# Patient Record
Sex: FEMALE | Race: WHITE | NOT HISPANIC OR LATINO | URBAN - METROPOLITAN AREA
[De-identification: names, ages, dates, MRNs, and addresses within clinical notes are randomized per-mention and may not be internally consistent; named-entity substitution may affect disease eponyms.]

---

## 2024-10-15 ENCOUNTER — OUTPATIENT (OUTPATIENT)
Dept: OUTPATIENT SERVICES | Facility: HOSPITAL | Age: 39
LOS: 1 days | End: 2024-10-15
Payer: COMMERCIAL

## 2024-10-15 VITALS
HEIGHT: 60 IN | SYSTOLIC BLOOD PRESSURE: 122 MMHG | DIASTOLIC BLOOD PRESSURE: 80 MMHG | OXYGEN SATURATION: 99 % | WEIGHT: 171.08 LBS | RESPIRATION RATE: 14 BRPM | HEART RATE: 73 BPM | TEMPERATURE: 99 F

## 2024-10-15 DIAGNOSIS — Z01.818 ENCOUNTER FOR OTHER PREPROCEDURAL EXAMINATION: ICD-10-CM

## 2024-10-15 LAB
ANION GAP SERPL CALC-SCNC: 13 MMOL/L — SIGNIFICANT CHANGE UP (ref 5–17)
BLD GP AB SCN SERPL QL: NEGATIVE — SIGNIFICANT CHANGE UP
BUN SERPL-MCNC: 11 MG/DL — SIGNIFICANT CHANGE UP (ref 7–23)
CALCIUM SERPL-MCNC: 9.7 MG/DL — SIGNIFICANT CHANGE UP (ref 8.4–10.5)
CHLORIDE SERPL-SCNC: 103 MMOL/L — SIGNIFICANT CHANGE UP (ref 96–108)
CO2 SERPL-SCNC: 20 MMOL/L — LOW (ref 22–31)
CREAT SERPL-MCNC: 0.74 MG/DL — SIGNIFICANT CHANGE UP (ref 0.5–1.3)
EGFR: 107 ML/MIN/1.73M2 — SIGNIFICANT CHANGE UP
GLUCOSE SERPL-MCNC: 68 MG/DL — LOW (ref 70–99)
HCT VFR BLD CALC: 39.8 % — SIGNIFICANT CHANGE UP (ref 34.5–45)
HGB BLD-MCNC: 13.7 G/DL — SIGNIFICANT CHANGE UP (ref 11.5–15.5)
MCHC RBC-ENTMCNC: 32.1 PG — SIGNIFICANT CHANGE UP (ref 27–34)
MCHC RBC-ENTMCNC: 34.4 GM/DL — SIGNIFICANT CHANGE UP (ref 32–36)
MCV RBC AUTO: 93.2 FL — SIGNIFICANT CHANGE UP (ref 80–100)
NRBC # BLD: 0 /100 WBCS — SIGNIFICANT CHANGE UP (ref 0–0)
PLATELET # BLD AUTO: 232 K/UL — SIGNIFICANT CHANGE UP (ref 150–400)
POTASSIUM SERPL-MCNC: 4.2 MMOL/L — SIGNIFICANT CHANGE UP (ref 3.5–5.3)
POTASSIUM SERPL-SCNC: 4.2 MMOL/L — SIGNIFICANT CHANGE UP (ref 3.5–5.3)
RBC # BLD: 4.27 M/UL — SIGNIFICANT CHANGE UP (ref 3.8–5.2)
RBC # FLD: 13 % — SIGNIFICANT CHANGE UP (ref 10.3–14.5)
RH IG SCN BLD-IMP: POSITIVE — SIGNIFICANT CHANGE UP
SODIUM SERPL-SCNC: 136 MMOL/L — SIGNIFICANT CHANGE UP (ref 135–145)
WBC # BLD: 8.02 K/UL — SIGNIFICANT CHANGE UP (ref 3.8–10.5)
WBC # FLD AUTO: 8.02 K/UL — SIGNIFICANT CHANGE UP (ref 3.8–10.5)

## 2024-10-15 PROCEDURE — 80048 BASIC METABOLIC PNL TOTAL CA: CPT

## 2024-10-15 PROCEDURE — 85027 COMPLETE CBC AUTOMATED: CPT

## 2024-10-15 PROCEDURE — 86850 RBC ANTIBODY SCREEN: CPT

## 2024-10-15 PROCEDURE — 86900 BLOOD TYPING SEROLOGIC ABO: CPT

## 2024-10-15 PROCEDURE — 36415 COLL VENOUS BLD VENIPUNCTURE: CPT

## 2024-10-15 PROCEDURE — G0463: CPT

## 2024-10-15 PROCEDURE — 86901 BLOOD TYPING SEROLOGIC RH(D): CPT

## 2024-10-15 RX ORDER — SODIUM CHLORIDE IRRIG SOLUTION 0.9 %
1000 SOLUTION, IRRIGATION IRRIGATION
Refills: 0 | Status: DISCONTINUED | OUTPATIENT
Start: 2024-10-16 | End: 2024-10-16

## 2024-10-15 RX ORDER — CLINDAMYCIN PHOSPHATE 150 MG/ML
900 INJECTION, SOLUTION INTRAVENOUS ONCE
Refills: 0 | Status: DISCONTINUED | OUTPATIENT
Start: 2024-10-16 | End: 2024-10-18

## 2024-10-15 RX ORDER — CHLORHEXIDINE GLUCONATE ORAL RINSE 1.2 MG/ML
1 SOLUTION DENTAL DAILY
Refills: 0 | Status: DISCONTINUED | OUTPATIENT
Start: 2024-10-16 | End: 2024-10-16

## 2024-10-15 RX ORDER — GENTAMICIN 10 MG/ML
390 INJECTION, SOLUTION INTRAMUSCULAR; INTRAVENOUS ONCE
Refills: 0 | Status: DISCONTINUED | OUTPATIENT
Start: 2024-10-16 | End: 2024-10-18

## 2024-10-15 NOTE — OB PST NOTE - WEIGHT: TOTAL WEIGHT IN LBS
Medications addended.
Spoke to patient  today and he reported some changes in his medications:    1)He has been taken off  Plavix and Aspirin by his cardiologist. Dr Jennifer Goldstein and has been put on Eliquis 5 mg twice a day    2 He was taken off Lisinopril and put on Dronedarone for his heart rhythm
11

## 2024-10-15 NOTE — OB PST NOTE - HISTORY OF PRESENT ILLNESS
39 year old female, , IVF pregnancy with implantation date of , JEFFERY 10/12/24, with PMH of . She presents today to PST prior to scheduled Primary  on 10/16/24. Patient denies recent fever, chills, chest pain, SOB, palpitations, or recent exposure to COVID-19. 39 year old female, , JEFFERY 10/12/24, IVF pregnancy, with PMH of hypothyroidism in pregnancy (on levothyroxine since being pregnant). She presents today to PST prior to scheduled Primary  on 10/16/24. Patient denies recent fever, chills, chest pain, SOB, palpitations, or recent exposure to COVID-19.

## 2024-10-15 NOTE — OB PST NOTE - ASSESSMENT
CAPRINI SCORE    AGE RELATED RISK FACTORS                                                             [ ] Age 41-60 years                                            (1 Point)  [ ] Age: 61-74 years                                           (2 Points)                 [ ] Age= 75 years                                                (3 Points)             DISEASE RELATED RISK FACTORS                                                       [ ] Edema in the lower extremities                 (1 Point)                     [ ] Varicose veins                                               (1 Point)                                 [X ] BMI > 25 Kg/m2                                            (1 Point)                                  [ ] Serious infection (ie PNA)                            (1 Point)                     [ ] Lung disease ( COPD, Emphysema)            (1 Point)                                                                          [ ] Acute myocardial infarction                         (1 Point)                  [ ] Congestive heart failure (in the previous month)  (1 Point)         [ ] Inflammatory bowel disease                            (1 Point)                  [ ] Central venous access, PICC or Port               (2 points)       (within the last month)                                                                [ ] Stroke (in the previous month)                        (5 Points)    [ ] Previous or present malignancy                       (2 points)                                                                                                                                                         HEMATOLOGY RELATED FACTORS                                                         [ ] Prior episodes of VTE                                     (3 Points)                     [ ] Positive family history for VTE                      (3 Points)                  [ ] Prothrombin 80673 A                                     (3 Points)                     [ ] Factor V Leiden                                                (3 Points)                        [ ] Lupus anticoagulants                                      (3 Points)                                                           [ ] Anticardiolipin antibodies                              (3 Points)                                                       [ ] High homocysteine in the blood                   (3 Points)                                             [ ] Other congenital or acquired thrombophilia      (3 Points)                                                [ ] Heparin induced thrombocytopenia                  (3 Points)                                        MOBILITY RELATED FACTORS  [ ] Bed rest                                                         (1 Point)  [ ] Plaster cast                                                    (2 points)  [ ] Bed bound for more than 72 hours           (2 Points)    GENDER SPECIFIC FACTORS  [X ] Pregnancy or had a baby within the last month   (1 Point)  [ ] Post-partum < 6 weeks                                   (1 Point)  [ ] Hormonal therapy  or oral contraception   (1 Point)  [ ] History of pregnancy complications              (1 point)  [ ] Unexplained or recurrent              (1 Point)    OTHER RISK FACTORS                                           (1 Point)  [ ] BMI >40, smoking, diabetes requiring insulin, chemotherapy  blood transfusions and length of surgery over 2 hours    SURGERY RELATED RISK FACTORS  [ ]  Section within the last month     (1 Point)  [ ] Minor surgery                                                  (1 Point)  [ ] Arthroscopic surgery                                       (2 Points)  [X ] Planned major surgery lasting more            (2 Points)      than 45 minutes     [ ] Elective hip or knee joint replacement       (5 points)       surgery                                                TRAUMA RELATED RISK FACTORS  [ ] Fracture of the hip, pelvis, or leg                       (5 Points)  [ ] Spinal cord injury resulting in paralysis             (5 points)       (in the previous month)    [ ] Paralysis  (less than 1 month)                             (5 Points)  [ ] Multiple Trauma within 1 month                        (5 Points)    Total Score [    4    ]    Caprini Score 0-2: Low Risk, NO VTE prophylaxis required for most patients, encourage ambulation  Caprini Score 3-6: Moderate Risk , pharmacologic VTE prophylaxis is indicated for most patients (in the absence of contraindications)  Caprini Score Greater than or =7: High risk, pharmocologic VTE prophylaxis indicated for most patients (in the absence of contraindications)

## 2024-10-15 NOTE — OB PST NOTE - NS_OBGYNHISTORY_OBGYN_ALL_OB_FT
39 year old female, , JEFFERY 10/12/24, IVF pregnancy, with PMH of hypothyroidism in pregnancy (on levothyroxine since being pregnant). She presents today to PST prior to scheduled Primary  on 10/16/24.

## 2024-10-15 NOTE — OB PST NOTE - PROBLEM SELECTOR PLAN 1
Primary  on 10/16/24.   CBC, BMP, and T&S done today at [facility].  Pre op instructions, including chlorhexidine, provided and all questions answered.  ERP instructions provided.

## 2024-10-15 NOTE — OB PST NOTE - NS_PREOPBLOODCONS_OBGYN_ALL_OB
The patient/family was/were informed of limited nature of the exam. Representative images were printed to be scanned into the chart or directly uploaded into the medical record. n/a

## 2024-10-16 ENCOUNTER — INPATIENT (INPATIENT)
Facility: HOSPITAL | Age: 39
LOS: 1 days | Discharge: HOME | DRG: 998 | End: 2024-10-18
Attending: STUDENT IN AN ORGANIZED HEALTH CARE EDUCATION/TRAINING PROGRAM | Admitting: STUDENT IN AN ORGANIZED HEALTH CARE EDUCATION/TRAINING PROGRAM
Payer: COMMERCIAL

## 2024-10-16 VITALS — HEART RATE: 81 BPM | SYSTOLIC BLOOD PRESSURE: 119 MMHG | DIASTOLIC BLOOD PRESSURE: 69 MMHG

## 2024-10-16 LAB — T PALLIDUM AB TITR SER: NEGATIVE — SIGNIFICANT CHANGE UP

## 2024-10-16 PROCEDURE — 59514 CESAREAN DELIVERY ONLY: CPT | Mod: AS,U9

## 2024-10-16 RX ORDER — OXYCODONE HYDROCHLORIDE 30 MG/1
5 TABLET, FILM COATED, EXTENDED RELEASE ORAL
Refills: 0 | Status: DISCONTINUED | OUTPATIENT
Start: 2024-10-16 | End: 2024-10-18

## 2024-10-16 RX ORDER — MAGNESIUM HYDROXIDE 400 MG/5ML
30 SUSPENSION, ORAL (FINAL DOSE FORM) ORAL
Refills: 0 | Status: DISCONTINUED | OUTPATIENT
Start: 2024-10-16 | End: 2024-10-18

## 2024-10-16 RX ORDER — FAMOTIDINE 40 MG
20 TABLET ORAL ONCE
Refills: 0 | Status: COMPLETED | OUTPATIENT
Start: 2024-10-16 | End: 2024-10-16

## 2024-10-16 RX ORDER — SODIUM CHLORIDE IRRIG SOLUTION 0.9 %
1000 SOLUTION, IRRIGATION IRRIGATION
Refills: 0 | Status: DISCONTINUED | OUTPATIENT
Start: 2024-10-16 | End: 2024-10-18

## 2024-10-16 RX ORDER — ONDANSETRON HCL/PF 4 MG/2 ML
4 VIAL (ML) INJECTION EVERY 6 HOURS
Refills: 0 | Status: DISCONTINUED | OUTPATIENT
Start: 2024-10-16 | End: 2024-10-17

## 2024-10-16 RX ORDER — OXYCODONE HYDROCHLORIDE 30 MG/1
5 TABLET, FILM COATED, EXTENDED RELEASE ORAL
Refills: 0 | Status: DISCONTINUED | OUTPATIENT
Start: 2024-10-16 | End: 2024-10-17

## 2024-10-16 RX ORDER — INFLUENZA VIRUS VACCINE 15; 15; 15; 15 UG/.5ML; UG/.5ML; UG/.5ML; UG/.5ML
0.5 SUSPENSION INTRAMUSCULAR ONCE
Refills: 0 | Status: DISCONTINUED | OUTPATIENT
Start: 2024-10-16 | End: 2024-10-18

## 2024-10-16 RX ORDER — MORPHINE SULFATE 30 MG/1
0.1 TABLET, FILM COATED, EXTENDED RELEASE ORAL ONCE
Refills: 0 | Status: DISCONTINUED | OUTPATIENT
Start: 2024-10-16 | End: 2024-10-17

## 2024-10-16 RX ORDER — NALOXONE HYDROCHLORIDE 0.4 MG/ML
0.1 INJECTION, SOLUTION INTRAMUSCULAR; INTRAVENOUS; SUBCUTANEOUS
Refills: 0 | Status: DISCONTINUED | OUTPATIENT
Start: 2024-10-16 | End: 2024-10-17

## 2024-10-16 RX ORDER — TETANUS TOXOID, REDUCED DIPHTHERIA TOXOID AND ACELLULAR PERTUSSIS VACCINE, ADSORBED 5; 2.5; 8; 8; 2.5 [IU]/.5ML; [IU]/.5ML; UG/.5ML; UG/.5ML; UG/.5ML
0.5 SUSPENSION INTRAMUSCULAR ONCE
Refills: 0 | Status: DISCONTINUED | OUTPATIENT
Start: 2024-10-16 | End: 2024-10-18

## 2024-10-16 RX ORDER — OXYTOCIN/RINGER'S LACTATE 20/500ML
42 PLASTIC BAG, INJECTION (ML) INTRAVENOUS
Qty: 30 | Refills: 0 | Status: DISCONTINUED | OUTPATIENT
Start: 2024-10-16 | End: 2024-10-18

## 2024-10-16 RX ORDER — SODIUM CITRATE AND CITRIC ACID MONOHYDRATE 334; 500 MG/5ML; MG/5ML
15 SOLUTION ORAL ONCE
Refills: 0 | Status: COMPLETED | OUTPATIENT
Start: 2024-10-16 | End: 2024-10-16

## 2024-10-16 RX ORDER — OXYCODONE HYDROCHLORIDE 30 MG/1
5 TABLET, FILM COATED, EXTENDED RELEASE ORAL ONCE
Refills: 0 | Status: DISCONTINUED | OUTPATIENT
Start: 2024-10-16 | End: 2024-10-18

## 2024-10-16 RX ORDER — ACETAMINOPHEN 325 MG
1000 TABLET ORAL ONCE
Refills: 0 | Status: COMPLETED | OUTPATIENT
Start: 2024-10-16 | End: 2024-10-16

## 2024-10-16 RX ORDER — KETOROLAC TROMETHAMINE 10 MG/1
30 TABLET, FILM COATED ORAL EVERY 6 HOURS
Refills: 0 | Status: ACTIVE | OUTPATIENT
Start: 2024-10-16 | End: 2024-10-18

## 2024-10-16 RX ORDER — ACETAMINOPHEN 325 MG
975 TABLET ORAL
Refills: 0 | Status: DISCONTINUED | OUTPATIENT
Start: 2024-10-16 | End: 2024-10-18

## 2024-10-16 RX ORDER — DIPHENHYDRAMINE HCL 12.5MG/5ML
25 LIQUID (ML) ORAL EVERY 6 HOURS
Refills: 0 | Status: DISCONTINUED | OUTPATIENT
Start: 2024-10-16 | End: 2024-10-18

## 2024-10-16 RX ADMIN — Medication 125 MILLILITER(S): at 10:40

## 2024-10-16 RX ADMIN — Medication 975 MILLIGRAM(S): at 20:29

## 2024-10-16 RX ADMIN — KETOROLAC TROMETHAMINE 30 MILLIGRAM(S): 10 TABLET, FILM COATED ORAL at 18:26

## 2024-10-16 RX ADMIN — Medication 125 MILLILITER(S): at 18:49

## 2024-10-16 RX ADMIN — Medication 975 MILLIGRAM(S): at 20:59

## 2024-10-16 RX ADMIN — KETOROLAC TROMETHAMINE 30 MILLIGRAM(S): 10 TABLET, FILM COATED ORAL at 19:15

## 2024-10-16 RX ADMIN — Medication 20 MILLIGRAM(S): at 10:40

## 2024-10-16 RX ADMIN — SODIUM CITRATE AND CITRIC ACID MONOHYDRATE 15 MILLILITER(S): 334; 500 SOLUTION ORAL at 10:40

## 2024-10-16 RX ADMIN — Medication 400 MILLIGRAM(S): at 14:28

## 2024-10-16 NOTE — OB RN DELIVERY SUMMARY - NSSELHIDDEN_OBGYN_ALL_OB_FT
[NS_DeliveryAttending1_OBGYN_ALL_OB_FT:[id number]=],[NS_DeliveryAssist1_OBGYN_ALL_OB_FT:[id number]=],[NS_DeliveryRN_OBGYN_ALL_OB_FT:[id number]==]

## 2024-10-16 NOTE — OB PROVIDER DELIVERY SUMMARY - NSSELHIDDEN_OBGYN_ALL_OB_FT
[NS_DeliveryAttending1_OBGYN_ALL_OB_FT:[id number]=],[NS_DeliveryAssist1_OBGYN_ALL_OB_FT:[id number]=],[NS_DeliveryRN_OBGYN_ALL_OB_FT:[id number]==]

## 2024-10-16 NOTE — OB PROVIDER DELIVERY SUMMARY - NSPROVIDERDELIVERYNOTE_OBGYN_ALL_OB_FT
Elective pLTCS of viable male infant. Grossly normal uterus, tubes, ovaries. Vaccuum applied successfully. Single layer uterine closure. Peritoneum repaired. Excellent hemostasis. APGARS 9/9    IVF 2L    Uout 50    DIctation #: [id number]

## 2024-10-16 NOTE — OB RN INTRAOPERATIVE NOTE - NSSELHIDDEN_OBGYN_ALL_OB_FT
[NS_DeliveryAttending1_OBGYN_ALL_OB_FT:[id number]=],[NS_DeliveryAssist1_OBGYN_ALL_OB_FT:[id number]=],[NS_DeliveryRN_OBGYN_ALL_OB_FT:[id number]==]

## 2024-10-16 NOTE — OB PROVIDER H&P - NSLOWPPHRISK_OBGYN_A_OB
No previous uterine incision/Lozano Pregnancy/Less than or equal to 4 previous vaginal births/No known bleeding disorder/No history of postpartum hemorrhage/No other PPH risks indicated

## 2024-10-16 NOTE — OB PROVIDER H&P - NS ATTEND AMEND GEN_ALL_CORE FT
Presenting for scheduled pLTCS.   Admit to L&D   NPO   T&S  Anesthesia consult     Camelia Cabello MD

## 2024-10-16 NOTE — OB PROVIDER H&P - ASSESSMENT
A/P: 40yo  @ 40w4d here for elective pLTCS  - Admit to L&D  - Routine labs   - EFM/TOCO: resuscitative measures prn  - NPO, Bicitra, Pepcid  - Anesthesia consult for epidural  - Plan for pLTCS    d/w Dr. Destiney Reyes, PAPearlC

## 2024-10-16 NOTE — OB PROVIDER H&P - NSHPPHYSICALEXAM_GEN_ALL_CORE
PE:    T(C): 36.9 (10-16-24 @ 09:48), Max: 37.2 (10-15-24 @ 11:29)  HR: 72 (10-16-24 @ 10:43) (67 - 91)  BP: 119/68 (10-16-24 @ 09:48) (119/68 - 122/80)  RR: 18 (10-16-24 @ 09:48) (12 - 18)  SpO2: 97% (10-16-24 @ 10:43) (97% - 100%)    General: NAD, A&Ox3  CV: RRR  Lungs: Clear bilat   Abd: soft, nontender, gravid  VE: deferred  EFM: reactive

## 2024-10-16 NOTE — OB RN PATIENT PROFILE - POST PARTUM DEPRESSION SCREEN OB 5
[id number].  Cony has been afebrile this shift.  Vitals are stable and WNL.  Lungs are clear.  Pt denied pain, nausea or other discomfort.  She took her pills with mom during the evening with no problem.  This morning will be her first dose of oral MMF.  This is a solution and mom thinks this will be very difficult to get her to take. Pt up late on her tablet, mom sleeping at bedside.  Hourly rounding completed.  Continue POC.   no

## 2024-10-16 NOTE — OB NEONATOLOGY/PEDIATRICIAN DELIVERY SUMMARY - NSPEDSNEONOTESA_OBGYN_ALL_OB_FT
Requested by OB to attend  for vacuum assisted delivery. 40.4 week old infant born to a 40 y/o  mother. Maternal labs: Blood Type: AB positive, Hep B negative, Hep C negative, Rubella Immune, RPR Negative, HIV negative, GBS Unknown. No significant maternal history. Prenatal course complicated by hypothyroidism on synthroid. This was an IVF pregnancy. AROM at delivery, clear. Arrived at 45 seconds of life. Infant dried, warmed, suctioned, and stimulated. Hat was placed. Infant with good tone and respiratory effort. Mom wants to breastfeed exclusively.  Mom defers Hep B. Desires Circumcision. EOS Score=0.05.

## 2024-10-16 NOTE — OB RN DELIVERY SUMMARY - NS_SEPSISRSKCALC_OBGYN_ALL_OB_FT
EOS calculated successfully. EOS Risk Factor: 0.5/1000 live births ([facility] national incidence); GA=40w4d; Temp=98.4; ROM=0.017; GBS='Unknown'; Antibiotics='No antibiotics or any antibiotics < 2 hrs prior to birth'

## 2024-10-16 NOTE — PRE-ANESTHESIA EVALUATION ADULT - NSANTHADDINFOFT_GEN_ALL_CORE
Patient requested  to be present during neuraxial anesthetic placement. Explained that it was a sterile procedure and he likely would be unable to be present while performing the neuraxial block. She also wanted to know the duration of the spinal anesthetic placement. Again, I explained that the procedure would take on average less than 5 minutes but that at times it may become more difficult due to positioning, scoliosis, or a myriad of other reasons. Finally, she asked for sedation. I explained to her that it was a possibility but generally only after the baby was delivered, since the medication could cross the placenta barrier and cause somnolence. Also, the patient would become sedated and possible have amnesia causing her to forget all or parts of the delivery.     I attempted to reassure the patient while being honest with her. She understands the risks, benefits, and alternatives, and agrees to proceed. She did become teary eyed after explaining all of this.

## 2024-10-16 NOTE — OB RN PATIENT PROFILE - BREASTFEEDING MOTHER TAUGHT HOW TO HAND EXPRESS THEIR OWN MILK
Health Maintenance       Colorectal Cancer Screen (View Topic Details)  Never done    COVID-19 Vaccine (1 - 2023-24 season)  Never done    Depression Screening (Yearly)  Due soon on 2/22/2025           Following review of the above:  Patient is not proceeding with: Colorectal Cancer Screening    Note: Refer to final orders and clinician documentation.       Statement Selected

## 2024-10-16 NOTE — OB RN DELIVERY SUMMARY - NS_VACUUMATTEMPT_OBGYN_ALL_OB
Message   Call patient  Chest X-ay showed no evidence of pneumonia  Verified Results  * XR CHEST PA & LATERAL [id number] 11:31AM Sofiya Watson    Order Number: [id number]     Test Name Result Flag Reference   XR CHEST PA & LATERAL (Report)     CHEST - DUAL ENERGY     INDICATION: Dyspnea     COMPARISON: None     VIEWS: PA (including soft tissue/bone algorithms) and lateral projections; 5 images     FINDINGS:        Cardiomediastinal silhouette appears unremarkable  The lungs are clear  No pneumothorax or pleural effusion  Visualized osseous structures appear within normal limits for the patient's age  IMPRESSION:     No active pulmonary disease         Workstation performed: [id number]     Signed by:   Calderon Key MD   6/28/16 Attempted and Successful

## 2024-10-17 LAB
BASOPHILS # BLD AUTO: 0.05 K/UL — SIGNIFICANT CHANGE UP (ref 0–0.2)
BASOPHILS NFR BLD AUTO: 0.4 % — SIGNIFICANT CHANGE UP (ref 0–2)
EOSINOPHIL # BLD AUTO: 0.01 K/UL — SIGNIFICANT CHANGE UP (ref 0–0.5)
EOSINOPHIL NFR BLD AUTO: 0.1 % — SIGNIFICANT CHANGE UP (ref 0–6)
HCT VFR BLD CALC: 33.6 % — LOW (ref 34.5–45)
HGB BLD-MCNC: 11.5 G/DL — SIGNIFICANT CHANGE UP (ref 11.5–15.5)
IMM GRANULOCYTES NFR BLD AUTO: 0.4 % — SIGNIFICANT CHANGE UP (ref 0–0.9)
LYMPHOCYTES # BLD AUTO: 19.4 % — SIGNIFICANT CHANGE UP (ref 13–44)
LYMPHOCYTES # BLD AUTO: 2.74 K/UL — SIGNIFICANT CHANGE UP (ref 1–3.3)
MCHC RBC-ENTMCNC: 32.4 PG — SIGNIFICANT CHANGE UP (ref 27–34)
MCHC RBC-ENTMCNC: 34.2 GM/DL — SIGNIFICANT CHANGE UP (ref 32–36)
MCV RBC AUTO: 94.6 FL — SIGNIFICANT CHANGE UP (ref 80–100)
MONOCYTES # BLD AUTO: 1.23 K/UL — HIGH (ref 0–0.9)
MONOCYTES NFR BLD AUTO: 8.7 % — SIGNIFICANT CHANGE UP (ref 2–14)
NEUTROPHILS # BLD AUTO: 10.03 K/UL — HIGH (ref 1.8–7.4)
NEUTROPHILS NFR BLD AUTO: 71 % — SIGNIFICANT CHANGE UP (ref 43–77)
NRBC # BLD: 0 /100 WBCS — SIGNIFICANT CHANGE UP (ref 0–0)
PLATELET # BLD AUTO: 210 K/UL — SIGNIFICANT CHANGE UP (ref 150–400)
RBC # BLD: 3.55 M/UL — LOW (ref 3.8–5.2)
RBC # FLD: 13.1 % — SIGNIFICANT CHANGE UP (ref 10.3–14.5)
WBC # BLD: 14.12 K/UL — HIGH (ref 3.8–10.5)
WBC # FLD AUTO: 14.12 K/UL — HIGH (ref 3.8–10.5)

## 2024-10-17 RX ADMIN — Medication 975 MILLIGRAM(S): at 03:33

## 2024-10-17 RX ADMIN — Medication 975 MILLIGRAM(S): at 20:11

## 2024-10-17 RX ADMIN — Medication 600 MILLIGRAM(S): at 17:12

## 2024-10-17 RX ADMIN — Medication 975 MILLIGRAM(S): at 09:15

## 2024-10-17 RX ADMIN — KETOROLAC TROMETHAMINE 30 MILLIGRAM(S): 10 TABLET, FILM COATED ORAL at 05:42

## 2024-10-17 RX ADMIN — Medication 975 MILLIGRAM(S): at 09:45

## 2024-10-17 RX ADMIN — Medication 600 MILLIGRAM(S): at 17:45

## 2024-10-17 RX ADMIN — Medication 5000 UNIT(S): at 05:42

## 2024-10-17 RX ADMIN — Medication 975 MILLIGRAM(S): at 04:03

## 2024-10-17 RX ADMIN — Medication 600 MILLIGRAM(S): at 23:34

## 2024-10-17 RX ADMIN — KETOROLAC TROMETHAMINE 30 MILLIGRAM(S): 10 TABLET, FILM COATED ORAL at 00:31

## 2024-10-17 RX ADMIN — Medication 975 MILLIGRAM(S): at 20:41

## 2024-10-17 RX ADMIN — Medication 5000 UNIT(S): at 17:33

## 2024-10-17 RX ADMIN — KETOROLAC TROMETHAMINE 30 MILLIGRAM(S): 10 TABLET, FILM COATED ORAL at 00:01

## 2024-10-17 RX ADMIN — KETOROLAC TROMETHAMINE 30 MILLIGRAM(S): 10 TABLET, FILM COATED ORAL at 06:12

## 2024-10-17 RX ADMIN — Medication 50 MICROGRAM(S): at 06:55

## 2024-10-17 RX ADMIN — KETOROLAC TROMETHAMINE 30 MILLIGRAM(S): 10 TABLET, FILM COATED ORAL at 13:01

## 2024-10-17 NOTE — PROGRESS NOTE ADULT - PROVIDER SPECIALTY LIST ADULT
Baby's Name: Janet Giang Mother's Name: Shmuel Flores       Baby's Date of Birth: [birth year] Date of Procedure: 11/9/17       Procedure(s) Done: Frenulectomy Physician: Dr. Pranav Matta         Baby's Name:   Weight / Amount Date/Location     Birth Weight  6 lb 14 oz   [birth year]/17    Discharge Weight  6 lb      Weight Check  7 lb 7 oz  11/10/17 [facility]    Last Weight 7 lb 12.5 oz 11/16/17 Pediatrician    Today's Weight - Pre-feed  8 lb 6.7 oz  11/20/17 [facility]    Today's Weight - Post-feed  8 lb 7.9  11/20/17 [facility]    Total Amount of Milk Transferred  1.2 oz          Next weight check at [address]  Date Location    7-10 days  [facility]     Notes:  Cheo Connelly had a freulectomy/frenotomy procedure on 11/9/17. This is a follow up consultation to evaluate feeding efficiency and weight gain. Mom has been breast feeding Tom every 2-3 hours, limiting him to 15 minutes per breast per pediatrician recommendation. Verle Headings is then supplemented with formula (3-4 ounces). To boost milk supply, mom should continue to take the More Milk Plus supplement (2 capsules, 3 times/day). Recommendations to add lactation cookies or Mother's Milk tea were made. To increase milk supply, mom is encouraged to pump 15 minutes following nursing sessions.     Todays weight gain= 1.2 ounces in 26 minutes of breastfeeding Anesthesia

## 2024-10-18 VITALS
RESPIRATION RATE: 18 BRPM | HEART RATE: 68 BPM | SYSTOLIC BLOOD PRESSURE: 108 MMHG | DIASTOLIC BLOOD PRESSURE: 67 MMHG | OXYGEN SATURATION: 100 % | TEMPERATURE: 98 F

## 2024-10-18 PROCEDURE — 86901 BLOOD TYPING SEROLOGIC RH(D): CPT

## 2024-10-18 PROCEDURE — 85025 COMPLETE CBC W/AUTO DIFF WBC: CPT

## 2024-10-18 PROCEDURE — 86850 RBC ANTIBODY SCREEN: CPT

## 2024-10-18 PROCEDURE — 86780 TREPONEMA PALLIDUM: CPT

## 2024-10-18 PROCEDURE — 86900 BLOOD TYPING SEROLOGIC ABO: CPT

## 2024-10-18 PROCEDURE — 59050 FETAL MONITOR W/REPORT: CPT

## 2024-10-18 PROCEDURE — 59025 FETAL NON-STRESS TEST: CPT

## 2024-10-18 RX ORDER — FAMOTIDINE 40 MG
1 TABLET ORAL
Refills: 0 | DISCHARGE

## 2024-10-18 RX ORDER — ASPIRIN 325 MG
1 TABLET ORAL
Refills: 0 | DISCHARGE

## 2024-10-18 RX ORDER — ACETAMINOPHEN 325 MG
3 TABLET ORAL
Qty: 0 | Refills: 0 | DISCHARGE
Start: 2024-10-18

## 2024-10-18 RX ORDER — PRENATAL VIT,CAL 76/IRON/FOLIC 29 MG-1 MG
1 TABLET ORAL
Refills: 0 | DISCHARGE

## 2024-10-18 RX ADMIN — Medication 975 MILLIGRAM(S): at 08:13

## 2024-10-18 RX ADMIN — Medication 975 MILLIGRAM(S): at 08:43

## 2024-10-18 RX ADMIN — Medication 5000 UNIT(S): at 05:20

## 2024-10-18 RX ADMIN — Medication 50 MICROGRAM(S): at 05:21

## 2024-10-18 NOTE — DISCHARGE NOTE OB - MEDICATION SUMMARY - MEDICATIONS TO TAKE
I will START or STAY ON the medications listed below when I get home from the hospital:    ibuprofen 600 mg oral tablet  -- 1 tab(s) by mouth every 6 hours  -- Indication: For Encounter for  delivery without indication    acetaminophen 325 mg oral tablet  -- 3 tab(s) by mouth every 6 hours  -- Indication: For Encounter for  delivery without indication

## 2024-10-18 NOTE — PROGRESS NOTE ADULT - SUBJECTIVE AND OBJECTIVE BOX
Day 1 of Anesthesia Pain Management Service    SUBJECTIVE:  Pain Scale Score:          [X] Refer to charted pain scores    THERAPY: Received PF spinal morphine as above    OBJECTIVE:    Sedation:        	[X] Alert	[ ] Drowsy	[ ] Arousable      [ ] Asleep       [ ] Unresponsive    Side Effects:	[X] None	[ ] Nausea	[ ] Vomiting         [ ] Pruritus  		[ ] Weakness            [ ] Numbness	          [ ] Other:    ASSESSMENT/ PLAN  [X] Patient transitioned to prn analgesics  [X] Pain management per primary service, pain service to sign off   [X]Documentation and Verification of current medications
Day 1 of Anesthesia Pain Management Service    SUBJECTIVE: Doing ok  Pain Scale Score:          [X] Refer to charted pain scores    THERAPY:    s/p    100 mcg PF morphine on 10\16\24       MEDICATIONS  (STANDING):  acetaminophen     Tablet .. 975 milliGRAM(s) Oral <User Schedule>  clindamycin IVPB 900 milliGRAM(s) IV Intermittent once  diphtheria/tetanus/pertussis (acellular) Vaccine (Adacel) 0.5 milliLiter(s) IntraMuscular once  gentamicin   IVPB 390 milliGRAM(s) IV Intermittent once  heparin   Injectable 5000 Unit(s) SubCutaneous every 12 hours  ibuprofen  Tablet. 600 milliGRAM(s) Oral every 6 hours  influenza   Vaccine 0.5 milliLiter(s) IntraMuscular once  ketorolac   Injectable 30 milliGRAM(s) IV Push every 6 hours  lactated ringers. 1000 milliLiter(s) (125 mL/Hr) IV Continuous <Continuous>  levothyroxine 50 MICROGram(s) Oral daily  morphine PF Spinal 0.1 milliGRAM(s) IntraThecal. once  oxytocin Infusion 42 milliUNIT(s)/Min (42 mL/Hr) IV Continuous <Continuous>    MEDICATIONS  (PRN):  dexAMETHasone  Injectable 4 milliGRAM(s) IV Push every 6 hours PRN Nausea  diphenhydrAMINE 25 milliGRAM(s) Oral every 6 hours PRN Pruritus  lanolin Ointment 1 Application(s) Topical every 6 hours PRN Sore Nipples  magnesium hydroxide Suspension 30 milliLiter(s) Oral two times a day PRN Constipation  naloxone Injectable 0.1 milliGRAM(s) IV Push every 3 minutes PRN For ANY of the following changes in patient status:  A. Breaths Per Minute LESS THAN 10, B. Oxygen saturation LESS THAN 90%, C. Sedation score of 6 for Stop After: 4 Times  ondansetron Injectable 4 milliGRAM(s) IV Push every 6 hours PRN Nausea  oxyCODONE    IR 5 milliGRAM(s) Oral every 3 hours PRN Mild Pain (1 - 3)  oxyCODONE    IR 5 milliGRAM(s) Oral every 3 hours PRN Moderate to Severe Pain (4-10)  oxyCODONE    IR 5 milliGRAM(s) Oral once PRN Moderate to Severe Pain (4-10)  simethicone 80 milliGRAM(s) Chew every 4 hours PRN Gas      OBJECTIVE:    Sedation:        	[X] Alert	 [ ] Drowsy	[ ] Arousable      [ ] Asleep       [ ] Unresponsive    Side Effects:	[X] None 	[ ] Nausea	[ ] Vomiting         [ ] Pruritus  		[ ] Weakness            [ ] Numbness	          [ ] Other:    Vital Signs Last 24 Hrs  T(C): 36.7 (17 Oct 2024 06:17), Max: 36.9 (16 Oct 2024 09:48)  T(F): 98 (17 Oct 2024 06:17), Max: 98.4 (16 Oct 2024 09:48)  HR: 59 (17 Oct 2024 06:17) (55 - 91)  BP: 113/71 (17 Oct 2024 06:17) (93/55 - 127/89)  BP(mean): 82 (16 Oct 2024 15:45) (70 - 105)  RR: 18 (17 Oct 2024 06:17) (16 - 18)  SpO2: 100% (17 Oct 2024 06:17) (95% - 100%)    Parameters below as of 17 Oct 2024 06:17  Patient On (Oxygen Delivery Method): room air        ASSESSMENT/ PLAN  [X] Patient to be transitioned to prn analgesics today  [X] Pain management per primary service, pain service to sign off   [X]Documentation and Verification of current medications
R1 Progress Note    Patient seen and examined at bedside, no acute overnight events. No acute complaints, pain well controlled. Patient is ambulating and tolerating regular diet. Pt has passed flatus and voiding spontaneously. Denies fever, chills, CP, SOB, N/V, HA, blurred vision.     Vital Signs Last 24 Hours  T(C): 36.7 (10-17-24 @ 21:00), Max: 36.8 (10-17-24 @ 12:52)  HR: 69 (10-17-24 @ 21:00) (59 - 69)  BP: 125/81 (10-17-24 @ 21:00) (108/72 - 125/81)  RR: 18 (10-17-24 @ 21:00) (18 - 18)  SpO2: 98% (10-17-24 @ 21:00) (97% - 100%)    I&O's Summary    16 Oct 2024 07:01  -  17 Oct 2024 07:00  --------------------------------------------------------  IN: 100 mL / OUT: 1226 mL / NET: -1126 mL        Physical Exam:  General: NAD  Pulm: breathing comfortably on room air  Abdomen: Soft, appropriately-tender, mildly-distended, fundus firm  Incision: Pfannenstiel incision CDI  Pelvic: Lochia wnl    Labs:    Blood Type: AB Positive  Antibody Screen: Negative  RPR: Negative               11.5   14.12 )-----------( 210      ( 10-17 @ 06:39 )             33.6                13.7   8.02  )-----------( 232      ( 10-15 @ 12:23 )             39.8         MEDICATIONS  (STANDING):  acetaminophen     Tablet .. 975 milliGRAM(s) Oral <User Schedule>  clindamycin IVPB 900 milliGRAM(s) IV Intermittent once  diphtheria/tetanus/pertussis (acellular) Vaccine (Adacel) 0.5 milliLiter(s) IntraMuscular once  gentamicin   IVPB 390 milliGRAM(s) IV Intermittent once  heparin   Injectable 5000 Unit(s) SubCutaneous every 12 hours  ibuprofen  Tablet. 600 milliGRAM(s) Oral every 6 hours  influenza   Vaccine 0.5 milliLiter(s) IntraMuscular once  lactated ringers. 1000 milliLiter(s) (125 mL/Hr) IV Continuous <Continuous>  levothyroxine 50 MICROGram(s) Oral daily  oxytocin Infusion 42 milliUNIT(s)/Min (42 mL/Hr) IV Continuous <Continuous>    MEDICATIONS  (PRN):  diphenhydrAMINE 25 milliGRAM(s) Oral every 6 hours PRN Pruritus  lanolin Ointment 1 Application(s) Topical every 6 hours PRN Sore Nipples  magnesium hydroxide Suspension 30 milliLiter(s) Oral two times a day PRN Constipation  oxyCODONE    IR 5 milliGRAM(s) Oral every 3 hours PRN Moderate to Severe Pain (4-10)  oxyCODONE    IR 5 milliGRAM(s) Oral once PRN Moderate to Severe Pain (4-10)  simethicone 80 milliGRAM(s) Chew every 4 hours PRN Gas  
R1 Progress Note    Patient seen and examined at bedside, no acute overnight events. No acute complaints, pain well controlled. Patient is ambulating and tolerating regular diet. Pt passed flatus and is voiding spontaneously.. Denies fever, chills, CP, SOB, N/V, HA, blurred vision.     Vital Signs Last 24 Hours  T(C): 36.7 (10-17-24 @ 00:24), Max: 37.2 (10-16-24 @ 09:27)  HR: 67 (10-17-24 @ 00:24) (55 - 91)  BP: 118/67 (10-17-24 @ 00:24) (93/55 - 127/89)  RR: 18 (10-17-24 @ 00:24) (12 - 18)  SpO2: 95% (10-17-24 @ 00:24) (95% - 100%)    I&O's Summary    16 Oct 2024 07:01  -  17 Oct 2024 06:00  --------------------------------------------------------  IN: 100 mL / OUT: 826 mL / NET: -726 mL        Physical Exam:  General: NAD  Abdomen: Soft, appropriately-tender, mildly-distended, fundus firm  Incision: Pfannenstiel incision CDI  Pelvic: Lochia wnl    Labs:    Blood Type: AB Positive  Antibody Screen: Negative  RPR: Negative               13.7   8.02  )-----------( 232      ( 10-15 @ 12:23 )             39.8         MEDICATIONS  (STANDING):  acetaminophen     Tablet .. 975 milliGRAM(s) Oral <User Schedule>  clindamycin IVPB 900 milliGRAM(s) IV Intermittent once  diphtheria/tetanus/pertussis (acellular) Vaccine (Adacel) 0.5 milliLiter(s) IntraMuscular once  gentamicin   IVPB 390 milliGRAM(s) IV Intermittent once  heparin   Injectable 5000 Unit(s) SubCutaneous every 8 hours  ibuprofen  Tablet. 600 milliGRAM(s) Oral every 6 hours  influenza   Vaccine 0.5 milliLiter(s) IntraMuscular once  ketorolac   Injectable 30 milliGRAM(s) IV Push every 6 hours  lactated ringers. 1000 milliLiter(s) (125 mL/Hr) IV Continuous <Continuous>  morphine PF Spinal 0.1 milliGRAM(s) IntraThecal. once  oxytocin Infusion 42 milliUNIT(s)/Min (42 mL/Hr) IV Continuous <Continuous>    MEDICATIONS  (PRN):  dexAMETHasone  Injectable 4 milliGRAM(s) IV Push every 6 hours PRN Nausea  diphenhydrAMINE 25 milliGRAM(s) Oral every 6 hours PRN Pruritus  lanolin Ointment 1 Application(s) Topical every 6 hours PRN Sore Nipples  magnesium hydroxide Suspension 30 milliLiter(s) Oral two times a day PRN Constipation  naloxone Injectable 0.1 milliGRAM(s) IV Push every 3 minutes PRN For ANY of the following changes in patient status:  A. Breaths Per Minute LESS THAN 10, B. Oxygen saturation LESS THAN 90%, C. Sedation score of 6 for Stop After: 4 Times  ondansetron Injectable 4 milliGRAM(s) IV Push every 6 hours PRN Nausea  oxyCODONE    IR 5 milliGRAM(s) Oral every 3 hours PRN Moderate to Severe Pain (4-10)  oxyCODONE    IR 5 milliGRAM(s) Oral once PRN Moderate to Severe Pain (4-10)  oxyCODONE    IR 5 milliGRAM(s) Oral every 3 hours PRN Mild Pain (1 - 3)  simethicone 80 milliGRAM(s) Chew every 4 hours PRN Gas

## 2024-10-18 NOTE — PROGRESS NOTE ADULT - NS ATTEND AMEND GEN_ALL_CORE FT
doing well. pain ok with po pain meds. doing well. pain ok with po pain meds. moving well.   afebrile vss  incision c.d.i  pod2 doing well  wants to go home  dc home today  fu in office in 2 weeks for incision check  gregoria myles md

## 2024-10-18 NOTE — DISCHARGE NOTE OB - NS MD DC FALL RISK RISK
For information on Fall & Injury Prevention, visit: [email or web address] OR  [email or web address] OR  https://www.cdc.gov/steadi/patient.html

## 2024-10-18 NOTE — DISCHARGE NOTE OB - FINANCIAL ASSISTANCE
[facility] provides services at a reduced cost to those who are determined to be eligible through [facility]’s financial assistance program. Information regarding [facility]’s financial assistance program can be found by going to [email or web address] or by calling [phone].

## 2024-10-18 NOTE — DISCHARGE NOTE OB - PATIENT PORTAL LINK FT
You can access the FollowMyHealth Patient Portal offered by [facility] by registering at the following website: [email or web address]. By joining [employer]’s FollowMyHealth portal, you will also be able to view your health information using other applications (apps) compatible with our system.

## 2024-10-18 NOTE — DISCHARGE NOTE OB - CARE PROVIDERS DIRECT ADDRESSES
[email or web address] the [facility]

## 2024-10-18 NOTE — DISCHARGE NOTE OB - CARE PROVIDER_API CALL
Camelia Cabello  Obstetrics and Gynecology  [address] [place] [id number]  Phone: [phone]  Fax: [phone]  Established Patient  Follow Up Time: 2 weeks

## 2024-10-18 NOTE — DISCHARGE NOTE OB - CARE PLAN
Principal Discharge DX:	Encounter for  delivery without indication  Assessment and plan of treatment:	self ambulation, pain control   1

## 2024-10-18 NOTE — PROGRESS NOTE ADULT - ASSESSMENT
38y/o  POD#2 from elective pLTCS. PMH significant for hypothyroidism. Pt currently stable and meeting postpartum milestones.     #Postpartum  - Continue with po analgesia  - Increase ambulation  - Continue regular diet  - H&H stable  - Incision site c/d/i    Ivonne Terry PGY1
40y/o  POD#1 from elective pLTCS. PMH significant for hypothyroidism.     #Hypothyroidism  - Synthroid 50    #Postpartum  - Continue with po analgesia  - Increase ambulation  - Continue regular diet  - AM CBC   - Incision site c/d/i    Ivonne Terry PGY1

## 2024-11-15 PROBLEM — Z00.00 ENCOUNTER FOR PREVENTIVE HEALTH EXAMINATION: Status: ACTIVE | Noted: 2024-11-15

## 2024-11-18 ENCOUNTER — APPOINTMENT (OUTPATIENT)
Dept: ULTRASOUND IMAGING | Facility: CLINIC | Age: 39
End: 2024-11-18

## 2024-11-18 PROCEDURE — 76700 US EXAM ABDOM COMPLETE: CPT

## 2024-11-18 NOTE — DISCHARGE NOTE OB - IF BREASTFEEDING, ADD A TOTAL OF 500 EXTRA CALORIES EACH DAY
Writer ordered sleep study as requested below.   Awaiting for MD to sign order.     Statement Selected

## 2024-12-05 ENCOUNTER — APPOINTMENT (OUTPATIENT)
Dept: SURGERY | Facility: CLINIC | Age: 39
End: 2024-12-05
Payer: COMMERCIAL

## 2024-12-05 VITALS
OXYGEN SATURATION: 98 % | HEART RATE: 57 BPM | DIASTOLIC BLOOD PRESSURE: 72 MMHG | SYSTOLIC BLOOD PRESSURE: 130 MMHG | BODY MASS INDEX: 29.06 KG/M2 | HEIGHT: 60 IN | WEIGHT: 148 LBS | TEMPERATURE: 97.8 F | RESPIRATION RATE: 16 BRPM

## 2024-12-05 DIAGNOSIS — K80.10 CALCULUS OF GALLBLADDER WITH CHRONIC CHOLECYSTITIS W/OUT OBSTRUCTION: ICD-10-CM

## 2024-12-05 DIAGNOSIS — R10.13 EPIGASTRIC PAIN: ICD-10-CM

## 2024-12-05 DIAGNOSIS — R10.11 RIGHT UPPER QUADRANT PAIN: ICD-10-CM

## 2024-12-05 PROCEDURE — 99204 OFFICE O/P NEW MOD 45 MIN: CPT

## 2024-12-10 ENCOUNTER — OUTPATIENT (OUTPATIENT)
Dept: OUTPATIENT SERVICES | Facility: HOSPITAL | Age: 39
LOS: 1 days | End: 2024-12-10
Payer: COMMERCIAL

## 2024-12-10 VITALS
RESPIRATION RATE: 16 BRPM | DIASTOLIC BLOOD PRESSURE: 81 MMHG | HEART RATE: 63 BPM | WEIGHT: 145.06 LBS | TEMPERATURE: 99 F | OXYGEN SATURATION: 98 % | SYSTOLIC BLOOD PRESSURE: 124 MMHG

## 2024-12-10 DIAGNOSIS — K80.20 CALCULUS OF GALLBLADDER WITHOUT CHOLECYSTITIS WITHOUT OBSTRUCTION: ICD-10-CM

## 2024-12-10 DIAGNOSIS — Z98.891 HISTORY OF UTERINE SCAR FROM PREVIOUS SURGERY: Chronic | ICD-10-CM

## 2024-12-10 DIAGNOSIS — K80.10 CALCULUS OF GALLBLADDER WITH CHRONIC CHOLECYSTITIS WITHOUT OBSTRUCTION: ICD-10-CM

## 2024-12-10 DIAGNOSIS — Z01.818 ENCOUNTER FOR OTHER PREPROCEDURAL EXAMINATION: ICD-10-CM

## 2024-12-10 DIAGNOSIS — Z98.890 OTHER SPECIFIED POSTPROCEDURAL STATES: Chronic | ICD-10-CM

## 2024-12-10 LAB
ALBUMIN SERPL ELPH-MCNC: 4 G/DL — SIGNIFICANT CHANGE UP (ref 3.3–5)
ALP SERPL-CCNC: 104 U/L — SIGNIFICANT CHANGE UP (ref 40–120)
ALT FLD-CCNC: 49 U/L — SIGNIFICANT CHANGE UP (ref 12–78)
ANION GAP SERPL CALC-SCNC: 1 MMOL/L — LOW (ref 5–17)
AST SERPL-CCNC: 22 U/L — SIGNIFICANT CHANGE UP (ref 15–37)
BILIRUB SERPL-MCNC: 0.5 MG/DL — SIGNIFICANT CHANGE UP (ref 0.2–1.2)
BLD GP AB SCN SERPL QL: SIGNIFICANT CHANGE UP
BUN SERPL-MCNC: 15 MG/DL — SIGNIFICANT CHANGE UP (ref 7–23)
CALCIUM SERPL-MCNC: 9.3 MG/DL — SIGNIFICANT CHANGE UP (ref 8.5–10.1)
CHLORIDE SERPL-SCNC: 111 MMOL/L — HIGH (ref 96–108)
CO2 SERPL-SCNC: 30 MMOL/L — SIGNIFICANT CHANGE UP (ref 22–31)
CREAT SERPL-MCNC: 1 MG/DL — SIGNIFICANT CHANGE UP (ref 0.5–1.3)
EGFR: 74 ML/MIN/1.73M2 — SIGNIFICANT CHANGE UP
GLUCOSE SERPL-MCNC: 88 MG/DL — SIGNIFICANT CHANGE UP (ref 70–99)
HCG SERPL-ACNC: <1 MIU/ML — SIGNIFICANT CHANGE UP
HCT VFR BLD CALC: 37.7 % — SIGNIFICANT CHANGE UP (ref 34.5–45)
HGB BLD-MCNC: 13.1 G/DL — SIGNIFICANT CHANGE UP (ref 11.5–15.5)
MCHC RBC-ENTMCNC: 31.6 PG — SIGNIFICANT CHANGE UP (ref 27–34)
MCHC RBC-ENTMCNC: 34.7 G/DL — SIGNIFICANT CHANGE UP (ref 32–36)
MCV RBC AUTO: 91.1 FL — SIGNIFICANT CHANGE UP (ref 80–100)
NRBC # BLD: 0 /100 WBCS — SIGNIFICANT CHANGE UP (ref 0–0)
PLATELET # BLD AUTO: 262 K/UL — SIGNIFICANT CHANGE UP (ref 150–400)
POTASSIUM SERPL-MCNC: 4.2 MMOL/L — SIGNIFICANT CHANGE UP (ref 3.5–5.3)
POTASSIUM SERPL-SCNC: 4.2 MMOL/L — SIGNIFICANT CHANGE UP (ref 3.5–5.3)
PROT SERPL-MCNC: 7.2 G/DL — SIGNIFICANT CHANGE UP (ref 6–8.3)
RBC # BLD: 4.14 M/UL — SIGNIFICANT CHANGE UP (ref 3.8–5.2)
RBC # FLD: 11.9 % — SIGNIFICANT CHANGE UP (ref 10.3–14.5)
SODIUM SERPL-SCNC: 142 MMOL/L — SIGNIFICANT CHANGE UP (ref 135–145)
WBC # BLD: 6.31 K/UL — SIGNIFICANT CHANGE UP (ref 3.8–10.5)
WBC # FLD AUTO: 6.31 K/UL — SIGNIFICANT CHANGE UP (ref 3.8–10.5)

## 2024-12-10 PROCEDURE — G0463: CPT

## 2024-12-10 PROCEDURE — 36415 COLL VENOUS BLD VENIPUNCTURE: CPT

## 2024-12-10 PROCEDURE — 86850 RBC ANTIBODY SCREEN: CPT

## 2024-12-10 PROCEDURE — 80053 COMPREHEN METABOLIC PANEL: CPT

## 2024-12-10 PROCEDURE — 86901 BLOOD TYPING SEROLOGIC RH(D): CPT

## 2024-12-10 PROCEDURE — 85027 COMPLETE CBC AUTOMATED: CPT

## 2024-12-10 PROCEDURE — 84702 CHORIONIC GONADOTROPIN TEST: CPT

## 2024-12-10 PROCEDURE — 86900 BLOOD TYPING SEROLOGIC ABO: CPT

## 2024-12-10 NOTE — H&P PST ADULT - NSICDXFAMILYHX_GEN_ALL_CORE_FT
FAMILY HISTORY:  Father  Still living? Yes, Estimated age: Age Unknown  FH: myocardial infarction, Age at diagnosis: Age Unknown    Mother  Still living? Yes, Estimated age: Age Unknown  FH: rheumatoid arthritis, Age at diagnosis: Age Unknown

## 2024-12-10 NOTE — H&P PST ADULT - GENERAL
[facility]  Transplant Infectious Disease Progress Note     Patient:  Haider Torrez, Date of birth 1964, Medical record number [id number]  Date of Visit:  10/19/2023         Assessment and Recommendations:   Recommendations:  - Continue IV GCV 2.5 mg/kg/d, probably for another 2 weeks, or until there has been a solid week without diarrhea (at which point a transition to vGCV is possible).   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  - Await pending GCV level from 10/18/2023 bloodwork from 4:53 am, which will reflect his IV GCV dosing interval of 1.25 mg/kg/d.  - Await pending stool O&P, strongy ab, schisto ab, IgE level.  - Continue bactrim for Pneumocystis prophylaxis, since CD4 count is 109.     Transplant Infectious Disease will continue to follow with you.      Assessment:  Haider Torrez is a 59 year old male with PMHx significant for IgA nephropathy s/p kidney transplant (2004, 2015) on tacrolimus + mycophenolic acid + prednisone 5 mg, history of BK viremia, CMV viremia, hypertension, and secondary hyperparathyroid who presented on 10/3/23 with persistent diarrhea x 3-4 weeks with concern for CMV proctitis/colitis.   Infectious Disease issues include:  - 10/5/2023 bx + for CMV proctitis/colitis. He has diarrhea, although this may also be due in part to norovirus or a parasite picked up prior to his immigration from [facility]. He has longstanding CMV viremia, with a positive test in our system since 1/11/2023. Serostatus prior to transplant was D-/R+. He started IV GCV 10/6/2023, and the diarrhea is improving in frequency. He would not absorb oral vGCV well enough to rely on it until diarrhea is resolved. He'll need to continue non-valcyte treatment until diarrhea is resolved enough to transition to oral therapy. On the day he  started GCV IV, viral load was 252, and by 10/9/2023 check it was < 35. From 10/17/2023 it was 144. GCV is difficult to dose at GFR of 25, and our nomogram does tend to underdose. With slight rise in CMV viral load on 10/17/2023, would favor using the higher end of induction dosing. Increased IV GCV from 1.25 mg/kg/d to 2.5 mg/kg/d on 10/18/2023. WBC may increase (might seem paradoxical to some) if we successfully treat CMV in the marrow.   - Norovirus viral shedding since 3/2023. If norovirus does not resolve with 2-week courses of nitazoxanide and/or azithromycin, then chronic treatment is generally not used. The current amount of diarrhea is contemporaneous with the diagnosis of CMV colitis, so for now favor not treating norovirus.   - Leukopenia. Often this is multifactorial, with the CMV infection itself contributing, as well as side effect of GCV and/or MMF and/or bactrim. Would continue to support with G-CSF.   - Hx of COVID (+09/20/22 s/p 5 days remdesivir) c/b post-COVID bacterial pneumonia ([place], 10/2022). This was treated with vanc x1, 3 days of azithromycin, and 5 days cefepime.   - [place] from [place] in [place], + refugee [place]. Also travel to [place]. Negative Quantiferon 6/18/2015.   - QTc interval: 443msec on 10/4/23   - Bacterial prophylaxis: none  - PCP prophylaxis: Bactrim   - Viral serostatus: CMV D-/R+, EBV D+/R+   - Fungal prophylaxis: none  - Immunization status: Due for annual COVID, flu   - Gamma globulin status: 810 on 10/3/23, received IVIG on 10/4/23.   - Isolation status: Good hand hygiene. He is in enteric precautions.     Ita Magallanes MD. Pager [phone]         Interval History:   Since Haider Torrez was last seen by me on 10/18/2023, he is feeling about the same, but it is ironic that when he was asked to submit stool for O&P, he could not produce for about 12 hours. His dose of IV GCV for rx of CMV colitis was increased yesterday. No blood visible in stool. Eating  well. He is in enteric precautions. He is not on dialysis. The amount of his urine is not changing from day to day. Ophtho consult ordered for today.       Transplants:  10/13/2015 (Kidney), 4/15/2004 (Kidney), Postoperative day:  2928.  Coordinator Meagan Richardson    Review of Systems:  CONSTITUTIONAL:  no fever, chills, sweats  EYES: no acute change to what he feels are age-related vision issues.  ENT:  no change to what he feels are normal age-related decrease in hearing issues. RESPIRATORY:  no cough, sputum production.  CARDIOVASCULAR:  no palpitations.   GASTROINTESTINAL:  no nausea  GENITOURINARY:  no dysuria. No blood visible in urine.   HEME:  no need for transfusions.   INTEGUMENT:  no new rash.   NEURO:           Current Medications & Allergies:      amLODIPine  5 mg Oral Daily    apixaban ANTICOAGULANT  5 mg Oral BID    calcium carbonate  500 mg Oral BID    carvedilol  25 mg Oral BID w/meals    cholecalciferol  10 mcg Oral Daily    clonazePAM  0.5 mg Oral At Bedtime    folic acid  1 mg Oral Daily    ganciclovir (CYTOVENE) 140 mg in D5W 100 mL intermittent infusion  2.5 mg/kg Intravenous Q24H    levothyroxine  100 mcg Oral Daily    pantoprazole  40 mg Oral Daily    predniSONE  5 mg Oral Daily    rOPINIRole  1 mg Oral At Bedtime    sodium bicarbonate  1,300 mg Oral TID    sodium chloride (PF)  3 mL Intracatheter Q8H    sulfamethoxazole-trimethoprim  1 tablet Oral Once per day on Mon Wed Fri    tacrolimus  2.5 mg Oral BID IS       Allergies   Allergen Reactions    Hydralazine Swelling    Betadine [Povidone Iodine]     Cephalexin Hives    Clindamycin Hives    Trazodone Swelling     Swelling of face/lips            Physical Exam:   Patient Vitals for the past 24 hrs:   BP Temp Temp src Pulse Resp SpO2   10/19/23 0500 (!) 149/72 97.8  F (36.6  C) Oral 57 16 97 %   10/18/23 2045 (!) 161/81 -- -- 59 -- 100 %   10/18/23 2044 (!) 161/81 98  F (36.7  C) Oral 59 18 100 %   10/18/23 2003 (!) 158/79 97.5  F  (36.4  C) Oral 59 16 99 %   10/18/23 1900 (!) 159/82 -- -- 62 -- 98 %   10/18/23 1859 (!) 159/82 98  F (36.7  C) Oral 62 18 98 %   10/18/23 1846 (!) 155/79 97.9  F (36.6  C) Oral 65 18 99 %   10/18/23 1812 (!) 153/79 -- -- 65 -- --   10/18/23 1315 (!) 148/74 97.9  F (36.6  C) Oral -- 16 95 %     Ranges for vital signs:  Temp:  [97.5  F (36.4  C)-98  F (36.7  C)] 97.8  F (36.6  C)  Pulse:  [57-65] 57  Resp:  [16-18] 16  BP: (148-161)/(72-82) 149/72  SpO2:  [95 %-100 %] 97 %  Vitals:    10/07/23 0900 10/08/23 0500 10/12/23 1648   Weight: 59.3 kg (130 lb 12.8 oz) 59.6 kg (131 lb 8 oz) 61.9 kg (136 lb 7.4 oz)       Physical Examination:  GENERAL:  well-developed, well-nourished man, resting in bed in no acute distress.  HEAD:  Head is normocephalic, atraumatic   EYES:  Eyes have anicteric sclerae   ENT:  Oropharynx is moist without exudates or ulcers; good visualization with tongue blade and light. Tongue is midline. No thrush.   NECK:  Supple.   LUNGS:  Clear to auscultation bilateral.   CARDIOVASCULAR:  Regular rate and rhythm with no murmur  ABDOMEN:  Normal bowel sounds, soft, nontender.   SKIN:  No acute rashes. 10/14/2023 left PICC line in place without any surrounding erythema or exudate. AV fistula in right forearm with good thrill. R arm is more swollen than the left arm.   NEUROLOGIC:  Grossly nonfocal. Active x4 extremities         Laboratory Data:     Absolute CD4, [place] T Cells   Date Value Ref Range Status   10/18/2023 109 (L) 441 - 2,156 cells/uL Final       Immune Globulin Studies     Recent Labs   Lab Test 10/19/23  0454 10/03/23  1240    810   IGM 41  --      --        Metabolic Studies       Recent Labs   Lab Test 10/19/23  0454 10/18/23  0453 10/14/15  1325 10/14/15  1021 10/14/15  0000 10/13/15  2231 10/13/15  2113 10/13/15  1215 10/12/15  1330    145   < >  --    < >  --   --    < > 136   POTASSIUM 4.5 4.4   < > 4.8   < > 5.4* 6.8*   < > 4.2   CHLORIDE 114* 115*   < >  --    <  >  --   --    < > 97   CO2 21* 21*   < >  --    < >  --   --    < > 31   ANIONGAP 8 9   < >  --    < >  --   --    < > 8   BUN 43.0* 40.8*   < >  --    < >  --   --    < > 41*   CR 2.87* 2.79*   < >  --    < >  --   --    < > 9.83*   GFRESTIMATED 25* 26*   < >  --    < >  --   --    < > 6*   * 115*   < >  --    < >  --   --    < > 95   A1C  --   --   --   --   --   --   --   --  5.1   CRISTIAN 7.8* 7.4*   < >  --    < >  --   --    < > 8.7   PHOS  --  2.8   < >  --    < >  --   --    < >  --    MAG  --  1.4*   < >  --    < >  --   --    < >  --    LACT  --   --   --  2.1  --   --  4.2*   < >  --    CKT  --   --   --   --   --  336*  --   --   --     < > = values in this interval not displayed.       Hepatic Studies    Recent Labs   Lab Test 10/17/23  0444 10/08/23  0946 10/03/23  1240 03/27/18  1510 10/12/15  1330   BILITOTAL  --  0.2 0.3  --  0.4   ALKPHOS  --  52 62  --  81   PROTTOTAL  --  5.1* 6.1*  --  8.4   ALBUMIN 2.9* 2.9* 3.9   < > 4.2   AST  --  18 12  --  10   ALT  --  <5 7  --  16    < > = values in this interval not displayed.       Hematology Studies   Recent Labs   Lab Test 10/19/23  0454 10/18/23  0453 10/17/23  0444 10/17/23  0444 10/16/23  0449 10/03/23  1240 05/01/23  0700   WBC 2.7* 2.4*  --  2.4* 3.6*   < >  --    76736  --   --   --   --   --   --  3.2*   ANEU 1.9 1.9  --   --   --   --   --    ANEUTAUTO  --   --   --  1.7 2.9   < >  --    ALYM 0.4* 0.3*   < >  --   --   --   --    ALYMPAUTO  --   --   --  0.4* 0.4*   < >  --    MELVIN 0.2 0.1   < >  --   --   --   --    AMONOAUTO  --   --   --  0.2 0.2   < >  --    AEOS 0.1 0.0   < >  --   --   --   --    AEOSAUTO  --   --   --  0.0 0.0   < >  --    ABSBASO  --   --   --  0.0 0.0   < >  --    HGB 8.1* 7.0*  --  7.3* 7.5*   < >  --    42817  --   --   --   --   --   --  10.8*   HCT 27.1* 23.8*  --  24.7* 25.3*   < >  --    * 134*  --  116* 124*   < >  --    43733  --   --   --   --   --   --  116*    < > = values in this interval not  displayed.       Clotting Studies    Recent Labs   Lab Test 10/12/23  1648 10/04/23  1203 05/02/16  0717 04/25/16  1057 11/02/15  0745 10/30/15  0718   INR 1.05 1.30* 2.6 2.6   < > 1.75*   PTT  --   --   --   --   --  38*    < > = values in this interval not displayed.       Iron Testing    Recent Labs   Lab Test 10/19/23  0454 10/18/23  1727 10/18/23  0453 10/17/23  0444 10/16/23  0449 10/03/23  1240 03/27/18  1510 10/18/15  0750 10/17/15  0700   IRON  --   --   --   --  55*  --  67  --  25*   FEB  --   --   --   --  173*  --  276  --  216*   IRONSAT  --   --   --   --  32  --  24  --  12*   OFE  --   --   --   --  456*  --  695*  --  883*   MCV 98  --  99   < > 100   < >  --    < > 94   FOLIC  --  19.3  --   --   --   --   --   --   --    B12  --   --  422  --   --   --   --   --   --     < > = values in this interval not displayed.       Arterial Blood Gas Testing    Recent Labs   Lab Test 10/13/15  2113   PH 7.33*   PCO2 40   PO2 89   HCO3 21   O2PER 1L        Thyroid Studies     Recent Labs   Lab Test 10/18/23  0453   TSH 12.60*   T4 0.82*       Urine Studies     Recent Labs   Lab Test 10/03/23  2244 11/17/15  1410 10/14/15  2220 10/13/15  0725   URINEPH 6.0 6.5 5.5 5.0   NITRITE Negative Negative Negative Negative   LEUKEST Negative Negative Small* Negative   WBCU 1 1 15* 1       Medication levels    Recent Labs   Lab Test 10/16/23  0449 10/05/23  0542 02/01/23  0710 10/23/15  0653 10/21/15  0648   TACROL 4.2*   < >  --    < > 12.1   MPACID  --   --   --   --  1.52   29297  --   --  3.0   < >  --    MPAG  --   --   --   --  93.8   84353  --   --  91   < >  --     < > = values in this interval not displayed.       Microbiology:  Last Culture results   Culture Micro   Date Value Ref Range Status   11/17/2015 No growth  Final   10/14/2015 No growth  Final   10/13/2015   Final    Test canceled - Lab  error  Canceled, Test credited     10/13/2015 No growth  Final           Last checks of Clostridioides  difficile testing  Recent Labs   Lab Test 10/04/23  0623   CDBPCT Negative       Infection Studies to assess Diarrhea   Recent Labs   Lab Test 10/04/23  0622   BIEPEC Negative   BISTEC Negative   BISHEI Negative   BIEAEC Negative   BIETEC Negative   PRL852 NA   BIADE Negative   BICAM Negative   BISAL Negative   BIVIBS Negative   BIVIBC Negative   BIYER Negative   BIGIA Negative   BINOR Positive*   BIROT Negative   BIPLE Negative   BIENT Negative   BIAST Negative   BISAP Negative       Virology:  Coronavirus-19 testing    Recent Labs   Lab Test 10/18/23  0453 09/20/22  1055   CD19 9  --    ACD19 34*  --    COVIDPCREXT  --  Detected*   SOUREXT  --  Swab, Nasopharynx       CMV viral loads    Recent Labs   Lab Test 10/17/23  0444 10/09/23  0534 10/06/23  1016 10/06/23  1016 05/01/23  0700   CMVQNT  --  <35*  --   --   --    CMVRESINST 144*  --   --  252*  --    CMVLOG 2.2 <1.5   < > 2.4  --    41188  --   --   --   --  318*    < > = values in this interval not displayed.       EBV DNA Copies/mL   Date Value Ref Range Status   10/03/2023 Not Detected Not Detected copies/mL Final       BK Virus Testing     Recent Labs   Lab Test 05/01/23  0700 03/13/23  0740   63698 29* <22*       Pathology:  Last Pathology Report   Case Report   Date Value Ref Range Status   10/05/2023   Final    Surgical Pathology Report                         Case: [id number]                                  Authorizing Provider:  Russ Dodge MD          Collected:           10/05/2023 09:10 AM          Ordering Location:     [facility]          Received:            10/05/2023 10:09 AM                                 Endoscopy                                                                    Pathologist:           Josey Otero MD                                                           Specimens:   A) - Large Intestine, Colon, Random Colon                                                           B) - Rectum, Distal rectum                                                                           C) - Rectum, Rectal anal transition nodule                                                  Clinical Information   Date Value Ref Range Status   10/05/2023   Final    Procedure:  COLONOSCOPY, WITH POLYPECTOMY AND BIOPSY  Pre-op Diagnosis: Diarrhea [R19.7]  Post-op Diagnosis: R19.7 - Diarrhea [ICD-10-CM]       Final Diagnosis   Date Value Ref Range Status   10/05/2023   Final    A. COLON, RANDOM BIOPSY:  Colonic mucosa with mild crypt architectural distortion, evidence of prior injury; no current evidence of cryptitis, dysplasia, or other histologic abnormality; report of CMV & adenovirus immunohistochemistry to follow     B. DISTAL RECTUM, BIOPSY:  Colonic mucosa with mild reactive changes, otherwise no evidence of microscopic or chronic colitis or other significant histologic abnormality; report of CMV & adenovirus immunohistochemistry to follow      3. ANORECTUM, BIOPSY OF NODULE AT TRANSITION:  Colonic mucosa with polypoid features, active inflammation with ulceration and granulation tissue; features concerning but not conclusive for viral cytopathic changes; report of adenovirus & CMV immunohistochemistry to follow          Imaging:  No results found for this or any previous visit (from the past 48 hour(s)).          New occlusive deep vein thrombus in the right internal jugular vein with surrounding collaterals.     details…

## 2024-12-10 NOTE — H&P PST ADULT - HISTORY OF PRESENT ILLNESS
38 y/o female with no PMH here for PST. Pt states she has had epigastric pain since mid October especially after meals. Pt s/p sonogram and diagnosed with gallstones. Pt currently on low fat diet and has minimal abdominal pain . Pt denies n/v/d and current abdominal pain. Pt electing for laparoscopic cholecystectomy on 12/18/24.

## 2024-12-11 PROBLEM — Z98.890 OTHER SPECIFIED POSTPROCEDURAL STATES: Chronic | Status: INACTIVE | Noted: 2024-10-15 | Resolved: 2024-12-10

## 2024-12-17 NOTE — ASU PATIENT PROFILE, ADULT - FALL HARM RISK - UNIVERSAL INTERVENTIONS
Bed in lowest position, wheels locked, appropriate side rails in place/Call bell, personal items and telephone in reach/Instruct patient to call for assistance before getting out of bed or chair/Non-slip footwear when patient is out of bed/[place] to call system/Physically safe environment - no spills, clutter or unnecessary equipment/Purposeful Proactive Rounding/Room/bathroom lighting operational, light cord in reach

## 2024-12-18 ENCOUNTER — TRANSCRIPTION ENCOUNTER (OUTPATIENT)
Age: 39
End: 2024-12-18

## 2024-12-18 ENCOUNTER — APPOINTMENT (OUTPATIENT)
Dept: SURGERY | Facility: HOSPITAL | Age: 39
End: 2024-12-18
Payer: COMMERCIAL

## 2024-12-18 ENCOUNTER — OUTPATIENT (OUTPATIENT)
Dept: OUTPATIENT SERVICES | Facility: HOSPITAL | Age: 39
LOS: 1 days | End: 2024-12-18
Payer: COMMERCIAL

## 2024-12-18 ENCOUNTER — RESULT REVIEW (OUTPATIENT)
Age: 39
End: 2024-12-18

## 2024-12-18 VITALS
RESPIRATION RATE: 15 BRPM | DIASTOLIC BLOOD PRESSURE: 75 MMHG | SYSTOLIC BLOOD PRESSURE: 123 MMHG | HEART RATE: 57 BPM | OXYGEN SATURATION: 97 %

## 2024-12-18 VITALS
WEIGHT: 145.06 LBS | DIASTOLIC BLOOD PRESSURE: 70 MMHG | HEART RATE: 75 BPM | OXYGEN SATURATION: 100 % | TEMPERATURE: 98 F | RESPIRATION RATE: 11 BRPM | SYSTOLIC BLOOD PRESSURE: 116 MMHG

## 2024-12-18 DIAGNOSIS — Z98.890 OTHER SPECIFIED POSTPROCEDURAL STATES: Chronic | ICD-10-CM

## 2024-12-18 DIAGNOSIS — Z98.891 HISTORY OF UTERINE SCAR FROM PREVIOUS SURGERY: Chronic | ICD-10-CM

## 2024-12-18 DIAGNOSIS — K80.10 CALCULUS OF GALLBLADDER WITH CHRONIC CHOLECYSTITIS WITHOUT OBSTRUCTION: ICD-10-CM

## 2024-12-18 LAB
ABO RH CONFIRMATION: SIGNIFICANT CHANGE UP
HCG UR QL: NEGATIVE — SIGNIFICANT CHANGE UP

## 2024-12-18 PROCEDURE — 47563 LAPARO CHOLECYSTECTOMY/GRAPH: CPT

## 2024-12-18 PROCEDURE — 36415 COLL VENOUS BLD VENIPUNCTURE: CPT

## 2024-12-18 PROCEDURE — 88304 TISSUE EXAM BY PATHOLOGIST: CPT

## 2024-12-18 PROCEDURE — 88304 TISSUE EXAM BY PATHOLOGIST: CPT | Mod: 26

## 2024-12-18 PROCEDURE — C1889: CPT

## 2024-12-18 PROCEDURE — 81025 URINE PREGNANCY TEST: CPT

## 2024-12-18 PROCEDURE — 47562 LAPAROSCOPIC CHOLECYSTECTOMY: CPT | Mod: AS

## 2024-12-18 PROCEDURE — C9399: CPT

## 2024-12-18 DEVICE — CLIP APPLIER COVIDIEN ENDOCLIP 10MM LARGE: Type: IMPLANTABLE DEVICE | Status: FUNCTIONAL

## 2024-12-18 RX ORDER — ACETAMINOPHEN 500MG 500 MG/1
2 TABLET, COATED ORAL
Qty: 30 | Refills: 0
Start: 2024-12-18

## 2024-12-18 RX ORDER — ONDANSETRON HYDROCHLORIDE 4 MG/1
4 TABLET, FILM COATED ORAL ONCE
Refills: 0 | Status: DISCONTINUED | OUTPATIENT
Start: 2024-12-18 | End: 2024-12-18

## 2024-12-18 RX ORDER — IBUPROFEN 200 MG
1 TABLET ORAL
Qty: 30 | Refills: 0
Start: 2024-12-18

## 2024-12-18 RX ORDER — CEFAZOLIN SODIUM 10 G
1000 VIAL (EA) INJECTION ONCE
Refills: 0 | Status: COMPLETED | OUTPATIENT
Start: 2024-12-18 | End: 2024-12-18

## 2024-12-18 RX ORDER — OXYCODONE HYDROCHLORIDE 30 MG/1
5 TABLET ORAL ONCE
Refills: 0 | Status: DISCONTINUED | OUTPATIENT
Start: 2024-12-18 | End: 2024-12-18

## 2024-12-18 RX ORDER — OXYCODONE HYDROCHLORIDE 30 MG/1
1 TABLET ORAL
Qty: 12 | Refills: 0
Start: 2024-12-18

## 2024-12-18 RX ORDER — 0.9 % SODIUM CHLORIDE 0.9 %
1000 INTRAVENOUS SOLUTION INTRAVENOUS
Refills: 0 | Status: DISCONTINUED | OUTPATIENT
Start: 2024-12-18 | End: 2024-12-18

## 2024-12-18 RX ORDER — HYDROMORPHONE HYDROCHLORIDE 2 MG/1
0.5 TABLET ORAL
Refills: 0 | Status: DISCONTINUED | OUTPATIENT
Start: 2024-12-18 | End: 2024-12-18

## 2024-12-18 RX ORDER — INDOCYANINE GREEN AND WATER 25 MG
1.75 KIT INJECTION ONCE
Refills: 0 | Status: COMPLETED | OUTPATIENT
Start: 2024-12-18 | End: 2024-12-18

## 2024-12-18 RX ADMIN — INDOCYANINE GREEN AND WATER 1.75 MILLIGRAM(S): KIT at 10:16

## 2024-12-18 RX ADMIN — Medication 75 MILLILITER(S): at 12:49

## 2024-12-18 RX ADMIN — HYDROMORPHONE HYDROCHLORIDE 0.5 MILLIGRAM(S): 2 TABLET ORAL at 12:49

## 2024-12-18 RX ADMIN — Medication 75 MILLILITER(S): at 10:16

## 2024-12-18 NOTE — ASU DISCHARGE PLAN (ADULT/PEDIATRIC) - C. MAKE IMPORTANT PERSONAL OR BUSINESS DECISIONS
Attempted to contact pt re: transportation concerns. Spoke with pt's sister, Amy Cobb, who indicated that has extreme difficulty leaving the home. Stated that she was discharged home from hospitalization via ambulance. Noted that home has steps to enter at all entrance ways. She stated that her  has recently returned to work due to exhausting all FMLA and that all other caregivers are unable to assist pt down the stairs. Advised that community transportation resources are unable to assist pt out of the home and noted that most ambulance companies do not provide transport to outpatient visits. Amy Cobb did indicated that they are in the process of arranging for in-home supports and have a meeting scheduled with [place] [employer] later this week. Encouraged Jessica to inquire about the possibility of obtaining a ramp for patient in order to allow her to safely leave the home. Amy Cobb indicated that she will discuss this and will return call to this provider if additional concerns arise.       Jewel Doyle, MSW, LISW-S  Oncology Social Worker
Statement Selected

## 2024-12-18 NOTE — ASU DISCHARGE PLAN (ADULT/PEDIATRIC) - FINANCIAL ASSISTANCE
[facility] provides services at a reduced cost to those who are determined to be eligible through [facility]’s financial assistance program. Information regarding [facility]’s financial assistance program can be found by going to [email or web address] or by calling [phone].

## 2024-12-18 NOTE — ASU DISCHARGE PLAN (ADULT/PEDIATRIC) - CARE PROVIDER_API CALL
Miguel Perez  Surgery  [address] [place] [address]  Phone: [phone]  Fax: [phone]  Established Patient  Follow Up Time:

## 2024-12-20 LAB — SURGICAL PATHOLOGY STUDY: SIGNIFICANT CHANGE UP

## 2024-12-24 ENCOUNTER — APPOINTMENT (OUTPATIENT)
Dept: SURGERY | Facility: CLINIC | Age: 39
End: 2024-12-24
Payer: COMMERCIAL

## 2024-12-24 DIAGNOSIS — Z90.49 ACQUIRED ABSENCE OF OTHER SPECIFIED PARTS OF DIGESTIVE TRACT: ICD-10-CM

## 2024-12-24 PROCEDURE — 99024 POSTOP FOLLOW-UP VISIT: CPT

## 2024-12-31 ENCOUNTER — TRANSCRIPTION ENCOUNTER (OUTPATIENT)
Age: 39
End: 2024-12-31

## 2025-04-07 NOTE — ASU PATIENT PROFILE, ADULT - IS PATIENT PREGNANT?
Medication: Patient requested refill for Levothyroxine 100 mcg and losartan 25 mg. She have scheduled establishing appt with Gretta and would like to  medication today.    Provider: Gretta  Preferred Contact Number: [phone] (home)      Pharmacy:  [facility]    Patient instructed to call pharmacy directly for future refills.      Advised patient that the nurse will call if there are questions or concerns, otherwise refill processing may take 48-72 hours.    no

## (undated) DEVICE — PLV-SCD MACHINE: Type: DURABLE MEDICAL EQUIPMENT

## (undated) DEVICE — TUBING STRYKER PNEUMOCLEAR HIGH FLOW HEATED

## (undated) DEVICE — TROCAR COVIDIEN VERSAPORT BLADELESS OPTICAL 11MM STANDARD

## (undated) DEVICE — SOL IRR BAG NS 0.9% 3000ML

## (undated) DEVICE — DRAPE TOWEL BLUE 17" X 24"

## (undated) DEVICE — DRSG STERISTRIPS 0.5 X 4"

## (undated) DEVICE — TROCAR COVIDIEN VERSAPORT BLADELESS OPTICAL 5MM STANDARD

## (undated) DEVICE — SUT POLYSORB 4-0 P-12 UNDYED

## (undated) DEVICE — VENODYNE/SCD SLEEVE CALF LARGE

## (undated) DEVICE — Device

## (undated) DEVICE — SUT POLYSORB 0 30" GU-46

## (undated) DEVICE — SUT POLYSORB 3-0 30" V-20 UNDYED

## (undated) DEVICE — WARMING BLANKET UPPER ADULT

## (undated) DEVICE — SHEARS COVIDIEN ENDO SHEAR 5MM X 31CM W UNIPOLAR CAUTERY

## (undated) DEVICE — VENODYNE/SCD SLEEVE CALF MEDIUM

## (undated) DEVICE — SOL IRR POUR H2O 1000ML

## (undated) DEVICE — TROCAR COVIDIEN VERSAONE FIXATION CANNULA 5MM

## (undated) DEVICE — GLV 8 PROTEXIS (BLUE)

## (undated) DEVICE — PLV/PSP-SUCTION IRRIGATOR STRYKER: Type: DURABLE MEDICAL EQUIPMENT

## (undated) DEVICE — DRAPE 3/4 SHEET W REINFORCEMENT 56X77"

## (undated) DEVICE — ENDOCATCH 10MM

## (undated) DEVICE — GLV 7.5 PROTEXIS (WHITE)

## (undated) DEVICE — TROCAR COVIDIEN VERSAONE BLUNT TIP HASSAN 12MM

## (undated) DEVICE — PACK GENERAL LAPAROSCOPY

## (undated) DEVICE — BLADE SCALPEL SAFETYLOCK #15

## (undated) DEVICE — PLV/PSP-ESU FORCE2 FIL 16736T: Type: DURABLE MEDICAL EQUIPMENT

## (undated) DEVICE — DISSECTOR ENDOSCOPIC KITTNER SINGLE TIP

## (undated) DEVICE — GOWN SMARTGOWN XL/XLONG

## (undated) DEVICE — NDL HYPO REGULAR BEVEL 25G X 1.5" (BLUE)